# Patient Record
Sex: FEMALE | Race: WHITE | NOT HISPANIC OR LATINO | URBAN - METROPOLITAN AREA
[De-identification: names, ages, dates, MRNs, and addresses within clinical notes are randomized per-mention and may not be internally consistent; named-entity substitution may affect disease eponyms.]

---

## 2023-08-21 ENCOUNTER — HOSPITAL ENCOUNTER (OUTPATIENT)
Dept: DATA CONVERSION | Facility: HOSPITAL | Age: 58
End: 2023-08-21

## 2023-08-21 DIAGNOSIS — Z15.01 GENETIC SUSCEPTIBILITY TO MALIGNANT NEOPLASM OF BREAST: ICD-10-CM

## 2023-08-21 DIAGNOSIS — Z80.3 FAMILY HISTORY OF MALIGNANT NEOPLASM OF BREAST: ICD-10-CM

## 2023-08-31 PROBLEM — E66.9 OBESITY (BMI 30-39.9): Status: ACTIVE | Noted: 2023-08-31

## 2023-08-31 PROBLEM — I49.5 SINUS NODE DYSFUNCTION (MULTI): Status: ACTIVE | Noted: 2023-08-31

## 2023-08-31 PROBLEM — R07.9 CHEST PAIN: Status: ACTIVE | Noted: 2019-09-13

## 2023-08-31 PROBLEM — M54.50 LOW BACK PAIN: Status: ACTIVE | Noted: 2019-09-13

## 2023-08-31 PROBLEM — K80.20 CHOLELITHIASIS WITHOUT OBSTRUCTION: Status: ACTIVE | Noted: 2023-08-31

## 2023-08-31 PROBLEM — R09.89 LABILE BLOOD PRESSURE: Status: ACTIVE | Noted: 2023-08-31

## 2023-08-31 PROBLEM — N92.6 IRREGULAR MENSTRUAL CYCLE: Status: ACTIVE | Noted: 2023-08-31

## 2023-08-31 PROBLEM — F41.9 ANXIETY: Status: ACTIVE | Noted: 2023-08-31

## 2023-08-31 PROBLEM — E04.1 LEFT THYROID NODULE: Status: ACTIVE | Noted: 2023-08-31

## 2023-08-31 PROBLEM — N95.1 PERIMENOPAUSE: Status: ACTIVE | Noted: 2023-08-31

## 2023-08-31 PROBLEM — N95.0 PMB (POSTMENOPAUSAL BLEEDING): Status: ACTIVE | Noted: 2023-08-31

## 2023-08-31 PROBLEM — I48.91 RAPID ATRIAL FIBRILLATION (MULTI): Status: ACTIVE | Noted: 2023-08-31

## 2023-08-31 PROBLEM — E78.5 DYSLIPIDEMIA: Status: ACTIVE | Noted: 2023-08-31

## 2023-08-31 PROBLEM — R10.9 ABDOMINAL PAIN: Status: ACTIVE | Noted: 2023-08-31

## 2023-08-31 PROBLEM — F41.1 ANXIETY STATE: Status: ACTIVE | Noted: 2019-09-13

## 2023-08-31 PROBLEM — R93.89 ABNORMAL COMPUTED TOMOGRAPHY SCAN: Status: ACTIVE | Noted: 2023-08-31

## 2023-08-31 PROBLEM — E04.2 NONTOXIC MULTINODULAR GOITER: Status: ACTIVE | Noted: 2023-08-31

## 2023-08-31 PROBLEM — R10.13 EPIGASTRIC PAIN: Status: ACTIVE | Noted: 2023-08-31

## 2023-08-31 PROBLEM — E55.9 VITAMIN D DEFICIENCY: Status: ACTIVE | Noted: 2023-08-31

## 2023-08-31 PROBLEM — M94.0 COSTOCHONDRITIS: Status: ACTIVE | Noted: 2023-08-31

## 2023-08-31 PROBLEM — F40.243 FEAR OF FLYING: Status: ACTIVE | Noted: 2020-10-28

## 2023-08-31 PROBLEM — I49.5 TACHYCARDIA-BRADYCARDIA (MULTI): Status: ACTIVE | Noted: 2023-08-31

## 2023-08-31 PROBLEM — I48.0 PAROXYSMAL ATRIAL FIBRILLATION (MULTI): Status: ACTIVE | Noted: 2023-08-31

## 2023-08-31 PROBLEM — K21.00 REFLUX ESOPHAGITIS: Status: ACTIVE | Noted: 2019-09-13

## 2023-08-31 PROBLEM — E66.01 MORBID (SEVERE) OBESITY DUE TO EXCESS CALORIES (MULTI): Status: ACTIVE | Noted: 2023-08-31

## 2023-08-31 PROBLEM — K80.00 CALCULUS OF GALLBLADDER WITH ACUTE CHOLECYSTITIS WITHOUT OBSTRUCTION: Status: ACTIVE | Noted: 2023-08-31

## 2023-08-31 RX ORDER — MULTIVITAMIN/IRON/FOLIC ACID 18MG-0.4MG
TABLET ORAL
COMMUNITY

## 2023-08-31 RX ORDER — SOTALOL HYDROCHLORIDE 80 MG/1
80 TABLET ORAL 2 TIMES DAILY
COMMUNITY
End: 2024-03-04 | Stop reason: SDUPTHER

## 2023-08-31 RX ORDER — APIXABAN 5 MG/1
TABLET, FILM COATED ORAL
COMMUNITY
Start: 2023-03-25 | End: 2024-03-04 | Stop reason: SDUPTHER

## 2023-08-31 RX ORDER — OMEGA-3/DHA/EPA/FISH OIL 108-162 MG
1 CAPSULE ORAL DAILY
COMMUNITY
End: 2024-02-28 | Stop reason: ALTCHOICE

## 2023-08-31 RX ORDER — ZINC GLUCONATE 100 MG
1 TABLET ORAL DAILY
COMMUNITY

## 2023-08-31 RX ORDER — EPINEPHRINE 0.22MG
AEROSOL WITH ADAPTER (ML) INHALATION
COMMUNITY

## 2023-08-31 RX ORDER — ERGOCALCIFEROL (VITAMIN D2) 10 MCG
1 TABLET ORAL DAILY
COMMUNITY
End: 2023-11-28 | Stop reason: ALTCHOICE

## 2023-08-31 RX ORDER — CHOLECALCIFEROL (VITAMIN D3) 50 MCG
2000 TABLET ORAL DAILY
COMMUNITY
Start: 2019-09-13

## 2023-10-03 ENCOUNTER — TELEPHONE (OUTPATIENT)
Dept: OBSTETRICS AND GYNECOLOGY | Facility: CLINIC | Age: 58
End: 2023-10-03
Payer: COMMERCIAL

## 2023-10-03 NOTE — TELEPHONE ENCOUNTER
Est pt has Breast MRI sched  for 10/05/2023 / pt waiting to know of ok to take Lorazepam 1 mg that she has at home prior to MRI / Per Dr Campos ok to take / pt to make sure she has a  / pt agrees

## 2023-10-05 ENCOUNTER — HOSPITAL ENCOUNTER (OUTPATIENT)
Dept: RADIOLOGY | Facility: HOSPITAL | Age: 58
Discharge: HOME | End: 2023-10-05
Payer: COMMERCIAL

## 2023-10-05 DIAGNOSIS — Z80.3 FAMILY HISTORY OF MALIGNANT NEOPLASM OF BREAST: ICD-10-CM

## 2023-10-05 DIAGNOSIS — Z15.01 GENETIC SUSCEPTIBILITY TO MALIGNANT NEOPLASM OF BREAST: ICD-10-CM

## 2023-10-05 PROCEDURE — A9575 INJ GADOTERATE MEGLUMI 0.1ML: HCPCS | Performed by: OBSTETRICS & GYNECOLOGY

## 2023-10-05 PROCEDURE — 2550000001 HC RX 255 CONTRASTS: Performed by: OBSTETRICS & GYNECOLOGY

## 2023-10-05 PROCEDURE — 77049 MRI BREAST C-+ W/CAD BI: CPT

## 2023-10-05 RX ORDER — GADOTERATE MEGLUMINE 376.9 MG/ML
20 INJECTION INTRAVENOUS
Status: COMPLETED | OUTPATIENT
Start: 2023-10-05 | End: 2023-10-05

## 2023-10-05 RX ADMIN — GADOTERATE MEGLUMINE 20 ML: 376.9 INJECTION INTRAVENOUS at 16:17

## 2023-11-28 ENCOUNTER — OFFICE VISIT (OUTPATIENT)
Dept: PRIMARY CARE | Facility: CLINIC | Age: 58
End: 2023-11-28
Payer: COMMERCIAL

## 2023-11-28 VITALS
SYSTOLIC BLOOD PRESSURE: 130 MMHG | BODY MASS INDEX: 43.41 KG/M2 | HEIGHT: 63 IN | OXYGEN SATURATION: 98 % | WEIGHT: 245 LBS | HEART RATE: 63 BPM | DIASTOLIC BLOOD PRESSURE: 78 MMHG | TEMPERATURE: 97 F

## 2023-11-28 DIAGNOSIS — E66.01 CLASS 3 SEVERE OBESITY WITH SERIOUS COMORBIDITY AND BODY MASS INDEX (BMI) OF 40.0 TO 44.9 IN ADULT, UNSPECIFIED OBESITY TYPE (MULTI): ICD-10-CM

## 2023-11-28 DIAGNOSIS — E78.5 DYSLIPIDEMIA: ICD-10-CM

## 2023-11-28 DIAGNOSIS — Z00.00 ANNUAL PHYSICAL EXAM: Primary | ICD-10-CM

## 2023-11-28 DIAGNOSIS — I48.0 PAROXYSMAL ATRIAL FIBRILLATION (MULTI): ICD-10-CM

## 2023-11-28 DIAGNOSIS — F41.9 ANXIETY: ICD-10-CM

## 2023-11-28 PROCEDURE — 1036F TOBACCO NON-USER: CPT | Performed by: INTERNAL MEDICINE

## 2023-11-28 PROCEDURE — 3008F BODY MASS INDEX DOCD: CPT | Performed by: INTERNAL MEDICINE

## 2023-11-28 PROCEDURE — 99396 PREV VISIT EST AGE 40-64: CPT | Performed by: INTERNAL MEDICINE

## 2023-11-28 RX ORDER — ALPRAZOLAM 0.25 MG/1
0.25 TABLET ORAL 2 TIMES DAILY PRN
Qty: 14 TABLET | Refills: 0 | Status: SHIPPED | OUTPATIENT
Start: 2023-11-28 | End: 2024-02-28

## 2023-11-28 ASSESSMENT — ENCOUNTER SYMPTOMS
FATIGUE: 0
SEIZURES: 0
POLYPHAGIA: 0
DEPRESSION: 0
TROUBLE SWALLOWING: 0
UNEXPECTED WEIGHT CHANGE: 0
OCCASIONAL FEELINGS OF UNSTEADINESS: 0
CHILLS: 0
MYALGIAS: 0
COUGH: 0
VOMITING: 0
POLYDIPSIA: 0
PALPITATIONS: 0
DYSURIA: 0
ABDOMINAL PAIN: 0
NUMBNESS: 0
LOSS OF SENSATION IN FEET: 0
WHEEZING: 0
SINUS PRESSURE: 0
FREQUENCY: 0
SHORTNESS OF BREATH: 0
NAUSEA: 0
TREMORS: 0
BACK PAIN: 0
BLOOD IN STOOL: 0

## 2023-11-28 ASSESSMENT — COLUMBIA-SUICIDE SEVERITY RATING SCALE - C-SSRS
1. IN THE PAST MONTH, HAVE YOU WISHED YOU WERE DEAD OR WISHED YOU COULD GO TO SLEEP AND NOT WAKE UP?: NO
2. HAVE YOU ACTUALLY HAD ANY THOUGHTS OF KILLING YOURSELF?: NO
6. HAVE YOU EVER DONE ANYTHING, STARTED TO DO ANYTHING, OR PREPARED TO DO ANYTHING TO END YOUR LIFE?: NO

## 2023-11-28 ASSESSMENT — PATIENT HEALTH QUESTIONNAIRE - PHQ9
1. LITTLE INTEREST OR PLEASURE IN DOING THINGS: NOT AT ALL
SUM OF ALL RESPONSES TO PHQ9 QUESTIONS 1 AND 2: 0
2. FEELING DOWN, DEPRESSED OR HOPELESS: NOT AT ALL

## 2023-11-28 ASSESSMENT — PAIN SCALES - GENERAL: PAINLEVEL: 2

## 2023-11-28 NOTE — PROGRESS NOTES
"Subjective   Patient ID: Minoo CABALLERO is a 57 y.o. female who presents for Annual Exam.    HPI     Takes Xanax for traveling         Had cholecystectomy on 11/01/22 by Dr Marquez. Had diarrhea for first few days which has resolved now.         She was diagnosed with A fib when she was admitted for acute cholecystitis. Currently on sotalol and eliquis         Had Colonoscopy in 2020, by Dr Siddiqui, will get records. - advised repeat in 10 years.    Review of Systems   Constitutional:  Negative for chills, fatigue and unexpected weight change.   HENT:  Negative for postnasal drip, sinus pressure and trouble swallowing.    Respiratory:  Negative for cough, shortness of breath and wheezing.    Cardiovascular:  Negative for chest pain, palpitations and leg swelling.   Gastrointestinal:  Negative for abdominal pain, blood in stool, nausea and vomiting.   Endocrine: Negative for polydipsia, polyphagia and polyuria.   Genitourinary:  Negative for dysuria and frequency.   Musculoskeletal:  Negative for back pain and myalgias.   Skin:  Negative for rash.   Neurological:  Negative for tremors, seizures and numbness.   Psychiatric/Behavioral:  Negative for behavioral problems.        Objective   /78 (BP Location: Left arm, Patient Position: Sitting, BP Cuff Size: Large adult)   Pulse 63   Temp 36.1 °C (97 °F) (Temporal)   Ht 1.6 m (5' 3\")   Wt 111 kg (245 lb)   SpO2 98%   BMI 43.40 kg/m²     Physical Exam  Constitutional:       General: She is not in acute distress.  HENT:      Head: Normocephalic and atraumatic.      Right Ear: Tympanic membrane normal.      Left Ear: Tympanic membrane normal.   Eyes:      Extraocular Movements: Extraocular movements intact.      Conjunctiva/sclera: Conjunctivae normal.      Pupils: Pupils are equal, round, and reactive to light.   Neck:      Vascular: No carotid bruit.   Cardiovascular:      Rate and Rhythm: Normal rate and regular rhythm.      Pulses: Normal pulses.      " Heart sounds: No murmur heard.  Pulmonary:      Effort: Pulmonary effort is normal.      Breath sounds: Normal breath sounds. No wheezing or rales.   Abdominal:      General: Bowel sounds are normal.      Palpations: Abdomen is soft. There is no mass.      Tenderness: There is no abdominal tenderness. There is no guarding.   Musculoskeletal:         General: Normal range of motion.      Cervical back: Normal range of motion and neck supple.      Right lower leg: No edema.      Left lower leg: No edema.   Lymphadenopathy:      Cervical: No cervical adenopathy.   Skin:     General: Skin is warm and dry.      Findings: No lesion.   Neurological:      General: No focal deficit present.      Mental Status: She is alert and oriented to person, place, and time.      Sensory: No sensory deficit.      Gait: Gait normal.   Psychiatric:         Mood and Affect: Mood normal.         Assessment/Plan        MADAN was seen today for annual exam.  Diagnoses and all orders for this visit:  Annual physical exam (Primary)  Paroxysmal atrial fibrillation (CMS/HCC)  -     TSH with reflex to Free T4 if abnormal; Future  Dyslipidemia  -     Lipid Panel; Future  Anxiety  -     ALPRAZolam (Xanax) 0.25 mg tablet; Take 1 tablet (0.25 mg) by mouth 2 times a day as needed for anxiety for up to 7 days.  Class 3 severe obesity with serious comorbidity and body mass index (BMI) of 40.0 to 44.9 in adult, unspecified obesity type (CMS/HCC)       Current Outpatient Medications   Medication Instructions    ALPRAZolam (XANAX) 0.25 mg, oral, 2 times daily PRN    b complex 0.4 mg tablet as directed Orally    cholecalciferol (VITAMIN D-3) 2,000 Units, oral, Daily    coenzyme Q-10 (Co Q-10) 100 mg capsule as directed Orally    Eliquis 5 mg tablet     multivit-min/iron/folic acid/K (ADULTS MULTIVITAMIN ORAL) 1 tablet, oral, Daily    omega 3-dha-epa-fish oil 108-162-1,000 mg capsule 1 capsule, oral, Daily    sotalol (BETAPACE) 80 mg, oral, 2 times daily     zinc gluconate 100 mg tablet 1 tablet, oral, Daily      Advised to exercise at least 30 minutes a day and increase as tolerated  Limit daily calories to less than 1800 werner a day  Avoid snacking in between meals.

## 2023-11-30 PROBLEM — I49.5 TACHYCARDIA-BRADYCARDIA (MULTI): Status: RESOLVED | Noted: 2023-08-31 | Resolved: 2023-11-30

## 2024-02-27 NOTE — PROGRESS NOTES
"Annual-menopause  Subjective   Minoo Lopez is a 58 y.o. female who is here for a routine exam.   Complaints:  denies vag bleed or discharge; denies pelvic  pain, pressure, or persistent bloating.        Last seen after h/s eval pmb( episode 2023)  w D&C/mirena insertion 23; findings post wall carpeted w lush endometrial lining and  clear endocervical polyp. Pathology benign proliferative endometrium. Reports only scant brown shedding since.  PMHx: BMI 41, prev 43.      Afib; chronic anticoagulation. Dr. Wahl and Dr. Pettit       Endocervical polyp/pmb .  Surg Hx: Lapscopic cholecystectomy with IOC 2022        d&c -hysterscopy 2023  Father: , KIDNEY DISEASE, HTN, HEART DISEASE  Mother:  81 yrs, OVARIAN CANCER, STROKE  Last pap  2022-neg,hpv-neg  Mamm 2023-neg/TC risk 23% ; MRI breast 10/6/23 neg  Pelvic US: 2023 13 cm uterus w 8cm fibroid; endo 15mm; ovaries n/s  currently sexually active; denies exposure concerns.   Other 10/2020 endo bx neg.   Total pregnancies  0.   Review of Systems   Constitutional:  Negative for activity change, fatigue and unexpected weight change.   Respiratory:  Negative for chest tightness and shortness of breath.    Cardiovascular:  Negative for chest pain and leg swelling.   Gastrointestinal:  Negative for abdominal distention and abdominal pain.   Genitourinary:  Negative for difficulty urinating, dysuria, genital sores, pelvic pain, vaginal bleeding, vaginal discharge and vaginal pain.   Musculoskeletal:  Negative for gait problem and joint swelling.   Skin:  Negative for color change and rash.   Neurological:  Negative for dizziness, weakness and headaches.   Hematological:  Negative for adenopathy.   Objective Visit Vitals  /86   Ht 1.6 m (5' 3\")   Wt 106 kg (233 lb 6.4 oz)   BMI 41.34 kg/m²   OB Status Implant   Smoking Status Never   BSA 2.17 m²       General:   Alert and oriented, in no acute distress   Neck: " Supple. No visible thyromegaly.    Breast/Axilla: Normal to palpation bilaterally without masses, skin changes, or nipple discharge.    Abdomen: Soft, non-tender, without masses or organomegaly   Vulva: Normal architecture without erythema, masses, or lesions.    Vagina: Normal mucosa without lesions, masses.  No abnormal vaginal discharge. No blood   Cervix: Normal without masses, lesions, or signs of cervicitis. No blood   Uterus: Grossly  mobile, nontender; fundus at symph;exm limited by bmi   Adnexa: No palp mass or tenderness; exam limited by bmi   Pelvic Floor No POP noted. No high tone pelvic floor    Psych  Rectal Normal affect. Normal mood.      Assessment/Plan   Encounter Diagnoses   Name Primary?    Well female exam with routine gynecological exam; grossly normal breast exam; GYN exam grossly normal but limited by high BMI.  Next Pap due 2025. Yes    Screening mammogram for breast cancer; requisition given.     Irregular bleeding; small amount of brown shedding which can be expected on Mirena IUD.  Given history of endometrial polyp, will evaluate endometrium with ultrasound.     Surveillance of intrauterine contraceptive device; proper position per speculum exam.     Body mass index 40.0-44.9, adult (CMS/HCC); adiposity increases risk for endometrial hyperplasia and/or cancer ; use of Mirena mitigates this risk.     Chronic anticoagulation; increases risk for bleeding; upcoming cardio evaluation to determine other treatment options.     Mirena iud placed to provide local progesterone due to ongoing risks for pmb=high bmi, nulliparity, and chronic anticoagulation.  Sabrina Campos MD

## 2024-02-28 ENCOUNTER — OFFICE VISIT (OUTPATIENT)
Dept: OBSTETRICS AND GYNECOLOGY | Facility: CLINIC | Age: 59
End: 2024-02-28
Payer: COMMERCIAL

## 2024-02-28 VITALS
SYSTOLIC BLOOD PRESSURE: 142 MMHG | BODY MASS INDEX: 41.36 KG/M2 | DIASTOLIC BLOOD PRESSURE: 86 MMHG | WEIGHT: 233.4 LBS | HEIGHT: 63 IN

## 2024-02-28 DIAGNOSIS — Z01.419 WELL FEMALE EXAM WITH ROUTINE GYNECOLOGICAL EXAM: Primary | ICD-10-CM

## 2024-02-28 DIAGNOSIS — Z79.01 CHRONIC ANTICOAGULATION: ICD-10-CM

## 2024-02-28 DIAGNOSIS — N92.6 IRREGULAR BLEEDING: ICD-10-CM

## 2024-02-28 DIAGNOSIS — Z30.431 SURVEILLANCE OF INTRAUTERINE CONTRACEPTIVE DEVICE: ICD-10-CM

## 2024-02-28 DIAGNOSIS — Z12.31 SCREENING MAMMOGRAM FOR BREAST CANCER: ICD-10-CM

## 2024-02-28 PROCEDURE — 3008F BODY MASS INDEX DOCD: CPT | Performed by: OBSTETRICS & GYNECOLOGY

## 2024-02-28 PROCEDURE — 1036F TOBACCO NON-USER: CPT | Performed by: OBSTETRICS & GYNECOLOGY

## 2024-02-28 PROCEDURE — 99396 PREV VISIT EST AGE 40-64: CPT | Performed by: OBSTETRICS & GYNECOLOGY

## 2024-02-28 RX ORDER — LEVONORGESTREL 52 MG/1
1 INTRAUTERINE DEVICE INTRAUTERINE ONCE
COMMUNITY
Start: 2023-06-20

## 2024-02-28 ASSESSMENT — ENCOUNTER SYMPTOMS
FATIGUE: 0
HEADACHES: 0
WEAKNESS: 0
UNEXPECTED WEIGHT CHANGE: 0
LOSS OF SENSATION IN FEET: 0
DYSURIA: 0
ABDOMINAL DISTENTION: 0
SHORTNESS OF BREATH: 0
ABDOMINAL PAIN: 0
COLOR CHANGE: 0
JOINT SWELLING: 0
ACTIVITY CHANGE: 0
DIFFICULTY URINATING: 0
ADENOPATHY: 0
DIZZINESS: 0
CHEST TIGHTNESS: 0
DEPRESSION: 0
OCCASIONAL FEELINGS OF UNSTEADINESS: 0

## 2024-02-28 ASSESSMENT — LIFESTYLE VARIABLES
AUDIT-C TOTAL SCORE: 3
SKIP TO QUESTIONS 9-10: 1
HOW OFTEN DO YOU HAVE A DRINK CONTAINING ALCOHOL: 2-3 TIMES A WEEK
HOW MANY STANDARD DRINKS CONTAINING ALCOHOL DO YOU HAVE ON A TYPICAL DAY: 1 OR 2
HOW OFTEN DO YOU HAVE SIX OR MORE DRINKS ON ONE OCCASION: NEVER

## 2024-02-28 ASSESSMENT — PATIENT HEALTH QUESTIONNAIRE - PHQ9
SUM OF ALL RESPONSES TO PHQ9 QUESTIONS 1 & 2: 0
1. LITTLE INTEREST OR PLEASURE IN DOING THINGS: NOT AT ALL
2. FEELING DOWN, DEPRESSED OR HOPELESS: NOT AT ALL

## 2024-02-28 ASSESSMENT — PAIN SCALES - GENERAL: PAINLEVEL: 0-NO PAIN

## 2024-02-29 PROBLEM — Z30.431 SURVEILLANCE OF INTRAUTERINE CONTRACEPTIVE DEVICE: Status: ACTIVE | Noted: 2024-02-29

## 2024-02-29 PROBLEM — Z79.01 CHRONIC ANTICOAGULATION: Status: ACTIVE | Noted: 2024-02-29

## 2024-02-29 PROBLEM — Z12.31 SCREENING MAMMOGRAM FOR BREAST CANCER: Status: ACTIVE | Noted: 2024-02-29

## 2024-02-29 PROBLEM — Z01.419 WELL FEMALE EXAM WITH ROUTINE GYNECOLOGICAL EXAM: Status: ACTIVE | Noted: 2024-02-29

## 2024-03-04 ENCOUNTER — OFFICE VISIT (OUTPATIENT)
Dept: CARDIOLOGY | Facility: CLINIC | Age: 59
End: 2024-03-04
Payer: COMMERCIAL

## 2024-03-04 VITALS
BODY MASS INDEX: 40.21 KG/M2 | DIASTOLIC BLOOD PRESSURE: 70 MMHG | SYSTOLIC BLOOD PRESSURE: 124 MMHG | WEIGHT: 227 LBS | HEART RATE: 106 BPM

## 2024-03-04 DIAGNOSIS — I48.0 PAF (PAROXYSMAL ATRIAL FIBRILLATION) (MULTI): Primary | ICD-10-CM

## 2024-03-04 PROCEDURE — 99213 OFFICE O/P EST LOW 20 MIN: CPT

## 2024-03-04 PROCEDURE — 3008F BODY MASS INDEX DOCD: CPT

## 2024-03-04 PROCEDURE — 1036F TOBACCO NON-USER: CPT

## 2024-03-04 RX ORDER — SOTALOL HYDROCHLORIDE 80 MG/1
80 TABLET ORAL 2 TIMES DAILY
Qty: 180 TABLET | Refills: 1 | Status: SHIPPED | OUTPATIENT
Start: 2024-03-04

## 2024-03-04 ASSESSMENT — PAIN SCALES - GENERAL: PAINLEVEL: 0-NO PAIN

## 2024-03-04 ASSESSMENT — ENCOUNTER SYMPTOMS: PALPITATIONS: 1

## 2024-03-04 NOTE — PROGRESS NOTES
Chief Complaint:   Atrial Fibrillation (Sotalol) and Follow-up     History Of Present Illness:    Minoo Lopez is a 58 y.o. female with a past medical history of hypertension, obesity and paroxysmal atrial fibrillation in the setting of a normal left ventricular systolic function with no significant valvular abnormalities.  Patient has been maintained on low-dose sotalol and apixaban with little recurrence of atrial fibrillation.  Patient states she monitors herself closely with a [employer] mobile daily.  Patient states she has not had an episode of atrial fibrillation since September.  However, she presents to the office today in atrial fibrillation.  She is compliant with her anticoagulation therapy.     Last Recorded Vitals:  Vitals:    03/04/24 0841   BP: 124/70   Pulse: 106   Weight: 103 kg (227 lb)       Past Medical History:  She has a past medical history of A-fib (CMS/HCC) and Tachycardia-bradycardia (CMS/HCC) (08/31/2023).    Past Surgical History:  She has a past surgical history that includes Cholecystectomy.      Social History:  She reports that she has never smoked. She has never used smokeless tobacco. She reports current alcohol use. She reports that she does not use drugs.    Family History:  Family History   Problem Relation Name Age of Onset    Ovarian cancer Mother      Stroke Mother      Diabetes Mother      Breast cancer Mother      Kidney disease Father      Hypertension Father      Heart disease Father      Aneurysm Maternal Grandmother      Diabetes Maternal Grandfather      Other (Circulation issues) Paternal Grandmother       Allergies:  Clindamycin    Outpatient Medications:  Current Outpatient Medications   Medication Instructions    ALPRAZolam (XANAX) 0.25 mg, oral, 2 times daily PRN    b complex 0.4 mg tablet as directed Orally    cholecalciferol (VITAMIN D-3) 2,000 Units, oral, Daily    coenzyme Q-10 (Co Q-10) 100 mg capsule as directed Orally    Eliquis 5 mg tablet     Mirena 21  "mcg/24 hours (8 yrs) 52 mg IUD 1 each, intrauterine, Once    multivit-min/iron/folic acid/K (ADULTS MULTIVITAMIN ORAL) 1 tablet, oral, Daily    sotalol (BETAPACE) 80 mg, oral, 2 times daily    zinc gluconate 100 mg tablet 1 tablet, oral, Daily     Physical Exam:  Physical Exam  Vitals reviewed.   Constitutional:       Appearance: Normal appearance.   Cardiovascular:      Rate and Rhythm: Tachycardia present. Rhythm irregular.      Pulses: Normal pulses.      Heart sounds: Normal heart sounds.   Pulmonary:      Effort: Pulmonary effort is normal.      Breath sounds: Normal breath sounds.   Abdominal:      General: Bowel sounds are normal.      Palpations: Abdomen is soft.   Musculoskeletal:         General: Normal range of motion.   Skin:     General: Skin is warm and dry.   Neurological:      General: No focal deficit present.      Mental Status: She is alert and oriented to person, place, and time.   Review of Systems   Cardiovascular:  Positive for palpitations.   All other systems reviewed and are negative.         Last Labs:  CBC -  Lab Results   Component Value Date    WBC 6.8 07/06/2023    HGB 12.3 07/06/2023    HCT 37.7 07/06/2023    MCV 95.0 07/06/2023     07/06/2023       CMP -  Lab Results   Component Value Date    CALCIUM 9.4 07/06/2023    PROT 7.2 11/02/2022    ALBUMIN 3.5 11/02/2022    AST 55 (H) 11/02/2022    ALT 40 11/02/2022    ALKPHOS 80 11/02/2022    BILITOT 0.8 11/02/2022       LIPID PANEL -   Lab Results   Component Value Date    CHOL 203 (H) 11/11/2021    TRIG 94 11/11/2021    HDL 56 11/11/2021    CHHDL 3.6 11/11/2021       RENAL FUNCTION PANEL -   Lab Results   Component Value Date    GLUCOSE 88 07/06/2023     07/06/2023    K 4.2 07/06/2023     07/06/2023    CO2 24 07/06/2023    ANIONGAP 13 07/06/2023    BUN 16 07/06/2023    CREATININE 0.8 07/06/2023    CALCIUM 9.4 07/06/2023    ALBUMIN 3.5 11/02/2022        No results found for: \"BNP\", \"HGBA1C\"    Last Cardiology " Tests:  ECG:  Atrial fibrillation at 106 bpm.  QRS duration 66 ms, QTc 435 ms.    Echo:  TRANSTHORACIC ECHOCARDIOGRAM REPORT        Patient Name:     MADAN CABALLERO  Reading Physician:   [id number] Mitch Ponce DO  Study Date:       10/31/2022          Referring Physician: [id number] Giacomo Pritchard DO  MRN/PID:          [id number]            PCP:  Accession/Order#: [id number]          Department Location:  YOB: 1965          Fellow:  Gender:           F                   Nurse:  Admit Date:       10/29/2022          Sonographer:         Bill Waddell [facility]  Admission Status: Inpatient - Routine Additional Staff:  Height:           160.02 cm           CC Report to:  Weight:           113.40 kg           Study Type:          ECHO 2-D WO  CONTRAST  BSA:              2.13 m2  Blood Pressure: 131 /88 mmHg     Diagnosis/ICD: R07.89-Other chest pain  Indication:    Chest Pain  Procedure/CPT: Echo Complete w Full [id number]  Study Detail: The following Echo studies were performed: 2D, M-Mode, Doppler  and  color flow. Technically challenging study due to poor acoustic  windows and body habitus.        PHYSICIAN INTERPRETATION:  Left Ventricle: Left ventricular systolic function is normal, with an  estimated ejection fraction of 60-65%. There are no regional wall motion  abnormalities. The left ventricular cavity size is normal. There is mild  concentric left ventricular hypertrophy. Spectral Doppler shows a normal  pattern of left ventricular diastolic filling.  Left Atrium: The left atrium is mildly dilated.  Right Ventricle: The right ventricle is normal in size. There is normal  right ventricular global systolic function.  Right Atrium: The right atrium is normal in size.  Aortic Valve: The aortic valve appears structurally normal. There is no  evidence of aortic valve regurgitation. The peak instantaneous gradient of  the aortic valve is 8.4 mmHg. The mean gradient of the aortic valve is 4.0  mmHg.  Mitral  Valve: The mitral valve is normal in structure. There is no evidence  of mitral valve regurgitation.  Tricuspid Valve: The tricuspid valve is structurally normal. No evidence of  tricuspid regurgitation.  Pulmonic Valve: The pulmonic valve is not well visualized. The pulmonic  valve regurgitation was not well visualized.  Pericardium: There is no pericardial effusion noted.  Aorta: The aortic root is normal.        CONCLUSIONS:  1. Left ventricular systolic function is normal with a 60-65% estimated  ejection fraction.  2. Mild concentric left ventricular hypertrophy.  3. Mild left atrial enlargement.    Assessment/Plan     Paroxysmal atrial fibrillation    We did discuss alternate antiarrhythmic drugs in addition to catheter-based therapy.  Patient is very hesitant to pursue cryoablation at this juncture.  Patient will monitor herself closely utilizing her [employer] mobile and contact us if she remains in atrial fibrillation.     Idania Bush, AMMON-CNP

## 2024-04-06 ENCOUNTER — HOSPITAL ENCOUNTER (OUTPATIENT)
Dept: RADIOLOGY | Facility: CLINIC | Age: 59
Discharge: HOME | End: 2024-04-06
Payer: COMMERCIAL

## 2024-04-06 VITALS — HEIGHT: 63 IN | WEIGHT: 221 LBS | BODY MASS INDEX: 39.16 KG/M2

## 2024-04-06 DIAGNOSIS — Z12.31 SCREENING MAMMOGRAM FOR BREAST CANCER: ICD-10-CM

## 2024-04-06 PROCEDURE — 77067 SCR MAMMO BI INCL CAD: CPT

## 2024-04-06 PROCEDURE — 77063 BREAST TOMOSYNTHESIS BI: CPT | Performed by: RADIOLOGY

## 2024-04-06 PROCEDURE — 77067 SCR MAMMO BI INCL CAD: CPT | Performed by: RADIOLOGY

## 2024-04-12 ENCOUNTER — TELEPHONE (OUTPATIENT)
Dept: CARDIOLOGY | Facility: CLINIC | Age: 59
End: 2024-04-12
Payer: COMMERCIAL

## 2024-06-19 ENCOUNTER — HOSPITAL ENCOUNTER (OUTPATIENT)
Dept: RADIOLOGY | Facility: CLINIC | Age: 59
Discharge: HOME | End: 2024-06-19
Payer: COMMERCIAL

## 2024-06-19 DIAGNOSIS — N92.6 IRREGULAR BLEEDING: ICD-10-CM

## 2024-06-19 PROCEDURE — 76856 US EXAM PELVIC COMPLETE: CPT | Performed by: RADIOLOGY

## 2024-06-19 PROCEDURE — 76856 US EXAM PELVIC COMPLETE: CPT

## 2024-06-19 PROCEDURE — 76830 TRANSVAGINAL US NON-OB: CPT | Performed by: RADIOLOGY

## 2024-09-05 ENCOUNTER — OFFICE VISIT (OUTPATIENT)
Dept: CARDIOLOGY | Facility: CLINIC | Age: 59
End: 2024-09-05
Payer: COMMERCIAL

## 2024-09-05 VITALS — DIASTOLIC BLOOD PRESSURE: 82 MMHG | SYSTOLIC BLOOD PRESSURE: 130 MMHG | HEART RATE: 64 BPM

## 2024-09-05 DIAGNOSIS — I48.0 PAF (PAROXYSMAL ATRIAL FIBRILLATION) (MULTI): Primary | ICD-10-CM

## 2024-09-05 PROBLEM — I48.91 RAPID ATRIAL FIBRILLATION (MULTI): Status: RESOLVED | Noted: 2023-08-31 | Resolved: 2024-09-05

## 2024-09-05 PROCEDURE — 93005 ELECTROCARDIOGRAM TRACING: CPT | Performed by: INTERNAL MEDICINE

## 2024-09-05 PROCEDURE — 99214 OFFICE O/P EST MOD 30 MIN: CPT | Performed by: INTERNAL MEDICINE

## 2024-09-05 PROCEDURE — 1036F TOBACCO NON-USER: CPT | Performed by: INTERNAL MEDICINE

## 2024-09-05 PROCEDURE — 93010 ELECTROCARDIOGRAM REPORT: CPT | Performed by: INTERNAL MEDICINE

## 2024-09-05 ASSESSMENT — PATIENT HEALTH QUESTIONNAIRE - PHQ9
SUM OF ALL RESPONSES TO PHQ9 QUESTIONS 1 AND 2: 0
1. LITTLE INTEREST OR PLEASURE IN DOING THINGS: NOT AT ALL
2. FEELING DOWN, DEPRESSED OR HOPELESS: NOT AT ALL

## 2024-09-05 ASSESSMENT — COLUMBIA-SUICIDE SEVERITY RATING SCALE - C-SSRS
6. HAVE YOU EVER DONE ANYTHING, STARTED TO DO ANYTHING, OR PREPARED TO DO ANYTHING TO END YOUR LIFE?: NO
1. IN THE PAST MONTH, HAVE YOU WISHED YOU WERE DEAD OR WISHED YOU COULD GO TO SLEEP AND NOT WAKE UP?: NO
2. HAVE YOU ACTUALLY HAD ANY THOUGHTS OF KILLING YOURSELF?: NO

## 2024-09-05 NOTE — ASSESSMENT & PLAN NOTE
History as above.  Continue sotalol.  The patient knows to call us if her atrial fibrillation does not subside.  She will continue to follow herself on a daily basis with her [employer] (formerly [employer]) mobile device.  Continue anticoagulation given her [id number] score of 3

## 2024-09-05 NOTE — PROGRESS NOTES
Chief Complaint   Patient presents with    Atrial Fibrillation      6 month follow up            Patient is well-known to me.  She is a 58-year-old female with a history of paroxysmal atrial fibrillation in the setting of hypertension, diabetes, and obesity.  She follows herself with her [employer] mobile device and notes that she has atrial fibrillation about once a week.  Often when she is nervous like today coming to the office she will go into atrial fibrillation.  Most episodes last less than a day.  She has symptoms of palpitations but not much in the way of other symptoms.  She is quite reluctant to undergo catheter-based therapy and prefers to continue with her current regimen of anticoagulation and sotalol    Atrial Fibrillation  Past medical history includes atrial fibrillation.        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    Irregular bleeding 08/31/2023    PMB (postmenopausal bleeding) 08/31/2023    Labile blood pressure 08/31/2023    Left thyroid nodule 08/31/2023    Low back pain 09/13/2019    Nontoxic multinodular goiter 08/31/2023    Morbid (severe) obesity due to excess calories (Multi) 08/31/2023    Obesity (BMI 30-39.9) 08/31/2023    Paroxysmal atrial fibrillation (Multi) 08/31/2023    Perimenopause 08/31/2023    Rapid atrial fibrillation (Multi) 08/31/2023    Reflux esophagitis 09/13/2019    Sinus node dysfunction (Multi) 08/31/2023    Vitamin D deficiency 08/31/2023    Screening mammogram for breast cancer 02/29/2024    Surveillance of intrauterine contraceptive device 02/29/2024    Body mass  "index 40.0-44.9, adult (Multi) 02/29/2024    Chronic anticoagulation 02/29/2024     Resolved Ambulatory Problems     Diagnosis Date Noted    Tachycardia-bradycardia (Multi) 08/31/2023     Past Medical History:   Diagnosis Date    A-fib (Multi)         Review of Systems   All other systems reviewed and are negative.       Objective     Vitals:    09/05/24 0837   BP: 130/82   Pulse: 64        Constitutional:       Appearance: Healthy appearance. Obese.   Pulmonary:      Effort: Pulmonary effort is normal.      Breath sounds: Normal breath sounds.   Cardiovascular:      PMI at left midclavicular line. Tachycardia present. Irregularly irregular rhythm.      Murmurs: There is a systolic murmur.      No gallop.  No click. No rub.   Pulses:     Intact distal pulses.   Abdominal:      General: Bowel sounds are normal.   Musculoskeletal:      Cervical back: Neck supple. Skin:     General: Skin is warm and dry.   Neurological:      General: No focal deficit present.            Lab Review:   No visits with results within 2 Month(s) from this visit.   Latest known visit with results is:   [facility] Encounter on 07/14/2023   Component Date Value    CONVERTED FINAL DIAGNOSIS 07/14/2023                      Value:  ENDOMETRIUM, CURETTINGS:       PROLIFERATIVE-TYPE ENDOMETRIUM, SEE COMMENT.    Comment: There is no atypical endometrial hyperplasia or malignancy.  Margaux Voss M.D.  07/17/2023 16:35  [facility] - 07/17/2023      CONVERTED CLINICAL DIAGN* 07/14/2023 Post menopausal bleeding     CONVERTED GROSS DESCRIPT* 07/14/2023                      Value:Received in formalin labeled with the patient's name and hospital number  and, \"[facility]\" is a 0.8 x 0.6 x 0.1 cm aggregate of gray-tan soft tissue  fragments and possible mucus. The specimen is entirely submitted in one  cassette.      CONVERTED PRE-OPERATIVE * 07/14/2023 D&C, hysteroscopy     CONVERTED MICROSCOPIC DE* 07/14/2023 Slides examined; description omitted.        ECG:  Coarse " atrial fibrillation with a ventricular response in the 60s QRS duration 80 ms QTc 410 ms    Assessment/plan:  History as above.  Continue sotalol.  The patient knows to call us if her atrial fibrillation does not subside.  She will continue to follow herself on a daily basis with her [employer] device.  Continue anticoagulation given her [id number] score of 3    Problem List Items Addressed This Visit    None  Visit Diagnoses       PAF (paroxysmal atrial fibrillation) (Multi)    -  Primary    Relevant Orders    ECG 12 lead (Clinic Performed)    Basic metabolic panel    Magnesium

## 2024-09-18 DIAGNOSIS — I48.0 PAF (PAROXYSMAL ATRIAL FIBRILLATION) (MULTI): ICD-10-CM

## 2024-11-17 DIAGNOSIS — I48.0 PAF (PAROXYSMAL ATRIAL FIBRILLATION) (MULTI): ICD-10-CM

## 2024-11-18 RX ORDER — SOTALOL HYDROCHLORIDE 80 MG/1
80 TABLET ORAL 2 TIMES DAILY
Qty: 180 TABLET | Refills: 3 | Status: SHIPPED | OUTPATIENT
Start: 2024-11-18

## 2024-12-02 ENCOUNTER — OFFICE VISIT (OUTPATIENT)
Dept: PRIMARY CARE | Facility: CLINIC | Age: 59
End: 2024-12-02
Payer: COMMERCIAL

## 2024-12-02 VITALS
DIASTOLIC BLOOD PRESSURE: 80 MMHG | TEMPERATURE: 96.9 F | HEART RATE: 63 BPM | BODY MASS INDEX: 38.8 KG/M2 | HEIGHT: 63 IN | WEIGHT: 219 LBS | SYSTOLIC BLOOD PRESSURE: 130 MMHG | OXYGEN SATURATION: 98 %

## 2024-12-02 DIAGNOSIS — M54.50 LOW BACK PAIN, UNSPECIFIED BACK PAIN LATERALITY, UNSPECIFIED CHRONICITY, UNSPECIFIED WHETHER SCIATICA PRESENT: ICD-10-CM

## 2024-12-02 DIAGNOSIS — F41.9 ANXIETY: ICD-10-CM

## 2024-12-02 DIAGNOSIS — I48.0 PAROXYSMAL ATRIAL FIBRILLATION (MULTI): ICD-10-CM

## 2024-12-02 DIAGNOSIS — Z00.00 ANNUAL PHYSICAL EXAM: Primary | ICD-10-CM

## 2024-12-02 DIAGNOSIS — R73.9 HYPERGLYCEMIA: ICD-10-CM

## 2024-12-02 PROCEDURE — 1036F TOBACCO NON-USER: CPT | Performed by: INTERNAL MEDICINE

## 2024-12-02 PROCEDURE — 99396 PREV VISIT EST AGE 40-64: CPT | Performed by: INTERNAL MEDICINE

## 2024-12-02 PROCEDURE — 3008F BODY MASS INDEX DOCD: CPT | Performed by: INTERNAL MEDICINE

## 2024-12-02 RX ORDER — CYCLOBENZAPRINE HCL 5 MG
5 TABLET ORAL NIGHTLY PRN
Qty: 15 TABLET | Refills: 1 | Status: SHIPPED | OUTPATIENT
Start: 2024-12-02 | End: 2025-01-01

## 2024-12-02 RX ORDER — ALPRAZOLAM 0.25 MG/1
0.25 TABLET ORAL 2 TIMES DAILY PRN
Qty: 14 TABLET | Refills: 0 | Status: SHIPPED | OUTPATIENT
Start: 2024-12-02 | End: 2024-12-09

## 2024-12-02 ASSESSMENT — ENCOUNTER SYMPTOMS
COUGH: 0
CHILLS: 0
NUMBNESS: 0
VOMITING: 0
LOSS OF SENSATION IN FEET: 0
SINUS PRESSURE: 0
TROUBLE SWALLOWING: 0
POLYPHAGIA: 0
MYALGIAS: 0
ABDOMINAL PAIN: 0
UNEXPECTED WEIGHT CHANGE: 0
FATIGUE: 0
TREMORS: 0
WHEEZING: 0
DEPRESSION: 0
BLOOD IN STOOL: 0
FREQUENCY: 0
SHORTNESS OF BREATH: 0
NAUSEA: 0
POLYDIPSIA: 0
OCCASIONAL FEELINGS OF UNSTEADINESS: 0
PALPITATIONS: 0
DYSURIA: 0
BACK PAIN: 0
SEIZURES: 0

## 2024-12-02 ASSESSMENT — PATIENT HEALTH QUESTIONNAIRE - PHQ9
SUM OF ALL RESPONSES TO PHQ9 QUESTIONS 1 AND 2: 0
2. FEELING DOWN, DEPRESSED OR HOPELESS: NOT AT ALL
1. LITTLE INTEREST OR PLEASURE IN DOING THINGS: NOT AT ALL

## 2024-12-02 ASSESSMENT — COLUMBIA-SUICIDE SEVERITY RATING SCALE - C-SSRS
2. HAVE YOU ACTUALLY HAD ANY THOUGHTS OF KILLING YOURSELF?: NO
1. IN THE PAST MONTH, HAVE YOU WISHED YOU WERE DEAD OR WISHED YOU COULD GO TO SLEEP AND NOT WAKE UP?: NO
6. HAVE YOU EVER DONE ANYTHING, STARTED TO DO ANYTHING, OR PREPARED TO DO ANYTHING TO END YOUR LIFE?: NO

## 2024-12-02 ASSESSMENT — PAIN SCALES - GENERAL: PAINLEVEL_OUTOF10: 3

## 2024-12-02 NOTE — PROGRESS NOTES
"Subjective   Patient ID: Minoo Lopez is a 58 y.o. female who presents for Annual Exam (Pulled something in back, and she wants to know if it is ok to take Ibuprofen ).    HPI     Takes Xanax for traveling         Had cholecystectomy on 11/01/22 by Dr Marquez. Had diarrhea for first few days which has resolved now.         She was diagnosed with A fib when she was admitted for acute cholecystitis. Currently on sotalol and eliquis         Had Colonoscopy in 2020, by Dr Siddiqui, will get records. - advised repeat in 10 years.    Low back pain since last few days, stated better today  Took Tylenol once, which helped  Denied any tingling or numbness in her feet    Review of Systems   Constitutional:  Negative for chills, fatigue and unexpected weight change.   HENT:  Negative for postnasal drip, sinus pressure and trouble swallowing.    Respiratory:  Negative for cough, shortness of breath and wheezing.    Cardiovascular:  Negative for chest pain, palpitations and leg swelling.   Gastrointestinal:  Negative for abdominal pain, blood in stool, nausea and vomiting.   Endocrine: Negative for polydipsia, polyphagia and polyuria.   Genitourinary:  Negative for dysuria and frequency.   Musculoskeletal:  Negative for back pain and myalgias.   Skin:  Negative for rash.   Neurological:  Negative for tremors, seizures and numbness.   Psychiatric/Behavioral:  Negative for behavioral problems.        Objective   /80 (BP Location: Left arm, Patient Position: Sitting, BP Cuff Size: Large adult)   Pulse 63   Temp 36.1 °C (96.9 °F) (Temporal)   Ht 1.6 m (5' 3\")   Wt 99.3 kg (219 lb)   SpO2 98%   BMI 38.79 kg/m²     Physical Exam  Constitutional:       General: She is not in acute distress.  HENT:      Head: Normocephalic and atraumatic.      Right Ear: Tympanic membrane normal.      Left Ear: Tympanic membrane normal.   Eyes:      Extraocular Movements: Extraocular movements intact.      Conjunctiva/sclera: " Conjunctivae normal.      Pupils: Pupils are equal, round, and reactive to light.   Neck:      Vascular: No carotid bruit.   Cardiovascular:      Rate and Rhythm: Normal rate and regular rhythm.      Pulses: Normal pulses.      Heart sounds: No murmur heard.  Pulmonary:      Effort: Pulmonary effort is normal.      Breath sounds: Normal breath sounds. No wheezing or rales.   Abdominal:      General: Bowel sounds are normal.      Palpations: Abdomen is soft. There is no mass.      Tenderness: There is no abdominal tenderness. There is no guarding.   Musculoskeletal:         General: Normal range of motion.      Cervical back: Normal range of motion and neck supple.      Right lower leg: No edema.      Left lower leg: No edema.   Lymphadenopathy:      Cervical: No cervical adenopathy.   Skin:     General: Skin is warm and dry.      Findings: No lesion.   Neurological:      General: No focal deficit present.      Mental Status: She is alert and oriented to person, place, and time.      Sensory: No sensory deficit.      Gait: Gait normal.   Psychiatric:         Mood and Affect: Mood normal.         Assessment/Plan        Minoo was seen today for annual exam.  Diagnoses and all orders for this visit:  Annual physical exam (Primary)  -     CBC and Auto Differential; Future  -     Hepatic function panel; Future  Anxiety  -     ALPRAZolam (Xanax) 0.25 mg tablet; Take 1 tablet (0.25 mg) by mouth 2 times a day as needed for anxiety for up to 7 days.  Paroxysmal atrial fibrillation (Multi)  Low back pain, unspecified back pain laterality, unspecified chronicity, unspecified whether sciatica present  -     cyclobenzaprine (Flexeril) 5 mg tablet; Take 1 tablet (5 mg) by mouth as needed at bedtime for muscle spasms.  Hyperglycemia  -     Hemoglobin A1C; Future     Advised to avoid NSAID's , take tylenol as needed for pain  Recommended physical therapy if no improvement pain  Give us a call we will give referral    Lab Results  "  Component Value Date    CHOL 203 (H) 11/11/2021    CHOL 165 10/24/2020    CHOL 173 10/05/2019     Lab Results   Component Value Date    HDL 56 11/11/2021    HDL 47 (L) 10/24/2020    HDL 50 (L) 10/05/2019     Lab Results   Component Value Date    LDLCALC 128 11/11/2021    LDLCALC 94 10/24/2020    LDLCALC 100 10/05/2019     Lab Results   Component Value Date    TRIG 94 11/11/2021    TRIG 120 10/24/2020    TRIG 114 10/05/2019     No components found for: \"CHOLHDL\"      Current Outpatient Medications   Medication Instructions    ALPRAZolam (XANAX) 0.25 mg, oral, 2 times daily PRN    apixaban (Eliquis) 5 mg tablet TAKE 1 TABLET BY MOUTH TWICE A DAY FOR 90 DAYS    ascorbic acid (VITAMIN C ORAL) 1 tablet, Daily    b complex 0.4 mg tablet as directed Orally    cholecalciferol (VITAMIN D-3) 2,000 Units, Daily    coenzyme Q-10 (Co Q-10) 100 mg capsule as directed Orally    cyclobenzaprine (FLEXERIL) 5 mg, oral, Nightly PRN    Mirena 21 mcg/24 hours (8 yrs) 52 mg IUD 1 each, Once    multivit-min/iron/folic acid/K (ADULTS MULTIVITAMIN ORAL) 1 tablet, Daily    sotalol (BETAPACE) 80 mg, oral, 2 times daily    zinc gluconate 100 mg tablet 1 tablet, Daily        "

## 2025-03-04 ENCOUNTER — OFFICE VISIT (OUTPATIENT)
Dept: CARDIOLOGY | Facility: CLINIC | Age: 60
End: 2025-03-04
Payer: COMMERCIAL

## 2025-03-04 VITALS
BODY MASS INDEX: 39.68 KG/M2 | WEIGHT: 224 LBS | SYSTOLIC BLOOD PRESSURE: 132 MMHG | HEART RATE: 80 BPM | DIASTOLIC BLOOD PRESSURE: 78 MMHG

## 2025-03-04 DIAGNOSIS — I48.0 PAF (PAROXYSMAL ATRIAL FIBRILLATION) (MULTI): Primary | ICD-10-CM

## 2025-03-04 PROCEDURE — G2211 COMPLEX E/M VISIT ADD ON: HCPCS | Performed by: INTERNAL MEDICINE

## 2025-03-04 PROCEDURE — 93005 ELECTROCARDIOGRAM TRACING: CPT | Performed by: INTERNAL MEDICINE

## 2025-03-04 PROCEDURE — 1036F TOBACCO NON-USER: CPT | Performed by: INTERNAL MEDICINE

## 2025-03-04 PROCEDURE — 99213 OFFICE O/P EST LOW 20 MIN: CPT | Mod: 25 | Performed by: INTERNAL MEDICINE

## 2025-03-04 PROCEDURE — 93010 ELECTROCARDIOGRAM REPORT: CPT | Performed by: INTERNAL MEDICINE

## 2025-03-04 PROCEDURE — 99213 OFFICE O/P EST LOW 20 MIN: CPT | Performed by: INTERNAL MEDICINE

## 2025-03-04 ASSESSMENT — PAIN SCALES - GENERAL: PAINLEVEL_OUTOF10: 0-NO PAIN

## 2025-03-04 ASSESSMENT — PATIENT HEALTH QUESTIONNAIRE - PHQ9
1. LITTLE INTEREST OR PLEASURE IN DOING THINGS: NOT AT ALL
2. FEELING DOWN, DEPRESSED OR HOPELESS: NOT AT ALL
SUM OF ALL RESPONSES TO PHQ9 QUESTIONS 1 AND 2: 0

## 2025-03-04 ASSESSMENT — ENCOUNTER SYMPTOMS
DEPRESSION: 0
LOSS OF SENSATION IN FEET: 0
OCCASIONAL FEELINGS OF UNSTEADINESS: 0

## 2025-03-04 NOTE — ASSESSMENT & PLAN NOTE
History as above.  Continue current regimen.  The patient remains reluctant to pursue catheter-based therapy given her severe anxiety.

## 2025-03-04 NOTE — PROGRESS NOTES
Chief Complaint   Patient presents with    Follow-up            The patient is a 59-year-old female with a history of paroxysmal atrial fibrillation in the setting of obesity.  She is maintained on sotalol and apixaban.  She is here for her 6-month follow-up.  She has been reluctant to pursue catheter-based therapy in the past and prefers pharmacological therapy.  The patient often goes into atrial fibrillation prior to visiting physicians office as she has today.  She notes over the past 6 months she has had about 5 episodes of atrial fibrillation usually in the setting of anxiety that lasts about 12 hours at the maximum.         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    Irregular bleeding 08/31/2023    PMB (postmenopausal bleeding) 08/31/2023    Labile blood pressure 08/31/2023    Left thyroid nodule 08/31/2023    Low back pain 09/13/2019    Nontoxic multinodular goiter 08/31/2023    Morbid (severe) obesity due to excess calories (Multi) 08/31/2023    Obesity (BMI 30-39.9) 08/31/2023    Paroxysmal atrial fibrillation (Multi) 08/31/2023    Perimenopause 08/31/2023    Reflux esophagitis 09/13/2019    Sinus node dysfunction (Multi) 08/31/2023    Vitamin D deficiency 08/31/2023    Screening mammogram for breast cancer 02/29/2024    Surveillance of intrauterine contraceptive device 02/29/2024    Body mass index 40.0-44.9, adult (Multi) 02/29/2024    Chronic anticoagulation 02/29/2024     Resolved Ambulatory Problems     Diagnosis Date Noted    Rapid atrial fibrillation (Multi) 08/31/2023    Tachycardia-bradycardia  "(Multi) 08/31/2023     Past Medical History:   Diagnosis Date    A-fib (Multi)         Review of Systems   All other systems reviewed and are negative.       Objective     There were no vitals filed for this visit.     Vitals and nursing note reviewed.   Constitutional:       Appearance: Healthy appearance. Obese.   HENT:    Mouth/Throat:      Pharynx: Oropharynx is clear.   Pulmonary:      Effort: Pulmonary effort is normal.      Breath sounds: Normal breath sounds.   Cardiovascular:      PMI at left midclavicular line. Normal rate. Regular rhythm. Normal S1. Normal S2.       Murmurs: There is a grade 1/6 holosystolic murmur.      No gallop.  No click. No rub.   Pulses:     Intact distal pulses.   Edema:     Peripheral edema absent.   Abdominal:      General: Bowel sounds are normal.   Musculoskeletal:      Cervical back: Normal range of motion. Skin:     General: Skin is warm and dry.   Neurological:      General: No focal deficit present.      Mental Status: Alert and oriented to person, place and time.            Lab Review:   No visits with results within 2 Month(s) from this visit.   Latest known visit with results is:   [facility] Encounter on 07/14/2023   Component Date Value    CONVERTED FINAL DIAGNOSIS 07/14/2023                      Value:  ENDOMETRIUM, CURETTINGS:       PROLIFERATIVE-TYPE ENDOMETRIUM, SEE COMMENT.    Comment: There is no atypical endometrial hyperplasia or malignancy.  Margaux Voss M.D.  07/17/2023 16:35  [facility] - 07/17/2023      CONVERTED CLINICAL DIAGN* 07/14/2023 Post menopausal bleeding     CONVERTED GROSS DESCRIPT* 07/14/2023                      Value:Received in formalin labeled with the patient's name and hospital number  and, \"[facility]\" is a 0.8 x 0.6 x 0.1 cm aggregate of gray-tan soft tissue  fragments and possible mucus. The specimen is entirely submitted in one  cassette.      CONVERTED PRE-OPERATIVE * 07/14/2023 D&C, hysteroscopy     CONVERTED MICROSCOPIC DE* 07/14/2023 Slides " examined; description omitted.        ECG:  Atrial fibrillation with rate controlled ventricular response of 80 bpm QRS duration 70 ms low voltage QTc 410 ms    Assessment/plan:  History as above.  Continue current regimen.  The patient remains reluctant to pursue catheter-based therapy given her severe anxiety.    Problem List Items Addressed This Visit    None  Visit Diagnoses       PAF (paroxysmal atrial fibrillation) (Multi)    -  Primary    Relevant Orders    ECG 12 lead (Clinic Performed)

## 2025-03-10 ASSESSMENT — ENCOUNTER SYMPTOMS
JOINT SWELLING: 0
FATIGUE: 0
WEAKNESS: 0
HEADACHES: 0
SHORTNESS OF BREATH: 0
DIZZINESS: 0
DYSURIA: 0
UNEXPECTED WEIGHT CHANGE: 0
ACTIVITY CHANGE: 0
CHEST TIGHTNESS: 0
COLOR CHANGE: 0
ADENOPATHY: 0
ABDOMINAL PAIN: 0
DIFFICULTY URINATING: 0
ABDOMINAL DISTENTION: 0

## 2025-03-10 NOTE — PROGRESS NOTES
"Annual-menopause  Subjective   Minoo Lopez is a 59 y.o. female , last seen 2/28/2024 ,who is here for a routine exam/IUD surveillance.   Complaints: Hx of H/S  placement of Mirena IUD 07/2023; path showing proliferative endometrium at age 57, as well as endocervical polyp; due to atrial fibrillation, patient is on chronic anticoagulation.  Denies vag bleed or discharge since last exam; denies any current pelvic  pain, pressure, or persistent bloating.  Last pap 02/2022 neg/HPV neg  Last mamm 4/10/2024 negative; TC risk 17%; 2023 completed breast MRI due to TC risk estimate 23%  Last colonoscopy 01/2020 negative; repeat 10 years per Dr. Siddiqui  Last pelvic ultrasound 06/2024: IUD in position/endometrium 5 mm   Review of Systems   Constitutional:  Negative for activity change, fatigue and unexpected weight change.   Respiratory:  Negative for chest tightness and shortness of breath.    Cardiovascular:  Negative for chest pain and leg swelling.   Gastrointestinal:  Negative for abdominal distention and abdominal pain.   Genitourinary:  Negative for difficulty urinating, dysuria, genital sores, pelvic pain, vaginal bleeding, vaginal discharge and vaginal pain.   Musculoskeletal:  Negative for gait problem and joint swelling.   Skin:  Negative for color change and rash.   Neurological:  Negative for dizziness, weakness and headaches.   Hematological:  Negative for adenopathy.   Objective Visit Vitals  /88   Ht 1.6 m (5' 3\")   Wt 103 kg (228 lb)   BMI 40.39 kg/m²   OB Status IUD   Smoking Status Never   BSA 2.14 m²       General:   Alert and oriented, in no acute distress   Neck: Supple. No visible thyromegaly.    Breast/Axilla: Normal to palpation bilaterally without masses, skin changes, or nipple discharge.    Abdomen: Soft, non-tender, without masses or organomegaly   Vulva: Normal architecture without erythema, masses, or lesions.    Vagina: Vault is long and narrow ;normal mucosa without lesions, " masses.  No abnormal vaginal discharge.    Cervix: High in vault ;normal without masses, lesions, or signs of cervicitis.  IUD strings palpable/no device is exposed   Uterus: Grossly normal mobile, non-enlarged uterus; exam limited by BMI   Adnexa: No palpable  masses or tenderness; exam limited by BMI   Pelvic Floor No POP noted. No high tone pelvic floor    Psych  Rectal Normal affect. Normal mood.   Deferred   Assessment/Plan   Encounter Diagnoses   Name Primary?    Well female exam with routine gynecological exam; no suspicious findings on current breast or pelvic exam Yes    Screening mammogram for breast cancer; order placed     Irregular bleeding; completed full hysteroscopic evaluation tissue sampling was benign per path; Mirena IUD placed and all bleeding has resolved since then.  Risk for endometrial bleeding include high BMI and chronic anticoagulation.       Perimenopause; irregular bleeding controlled as above     Surveillance of intrauterine contraceptive device; proper position on exam     Chronic anticoagulation; required due to atrial fibrillation; persistent risk for postmenopausal bleeding     Body mass index 40.0-44.9, adult (Multi); high adiposity confers higher endogenous estrogen levels; without progesterone treatment of her IUD patient would be at elevated risk for endometrial hyperplasia/carcinoma.     Screen for colon cancer; screening up-to-date and negative.     Sabrina Campos MD

## 2025-03-13 ENCOUNTER — OFFICE VISIT (OUTPATIENT)
Dept: OBSTETRICS AND GYNECOLOGY | Facility: CLINIC | Age: 60
End: 2025-03-13
Payer: COMMERCIAL

## 2025-03-13 VITALS
DIASTOLIC BLOOD PRESSURE: 88 MMHG | HEIGHT: 63 IN | SYSTOLIC BLOOD PRESSURE: 146 MMHG | BODY MASS INDEX: 40.4 KG/M2 | WEIGHT: 228 LBS

## 2025-03-13 DIAGNOSIS — Z01.419 WELL FEMALE EXAM WITH ROUTINE GYNECOLOGICAL EXAM: Primary | ICD-10-CM

## 2025-03-13 DIAGNOSIS — N92.6 IRREGULAR BLEEDING: ICD-10-CM

## 2025-03-13 DIAGNOSIS — Z12.11 SCREEN FOR COLON CANCER: ICD-10-CM

## 2025-03-13 DIAGNOSIS — Z79.01 CHRONIC ANTICOAGULATION: ICD-10-CM

## 2025-03-13 DIAGNOSIS — Z30.431 SURVEILLANCE OF INTRAUTERINE CONTRACEPTIVE DEVICE: ICD-10-CM

## 2025-03-13 DIAGNOSIS — N95.1 PERIMENOPAUSE: ICD-10-CM

## 2025-03-13 DIAGNOSIS — Z12.31 SCREENING MAMMOGRAM FOR BREAST CANCER: ICD-10-CM

## 2025-03-13 PROCEDURE — 99396 PREV VISIT EST AGE 40-64: CPT | Performed by: OBSTETRICS & GYNECOLOGY

## 2025-03-13 PROCEDURE — 3008F BODY MASS INDEX DOCD: CPT | Performed by: OBSTETRICS & GYNECOLOGY

## 2025-03-13 PROCEDURE — 1036F TOBACCO NON-USER: CPT | Performed by: OBSTETRICS & GYNECOLOGY

## 2025-03-13 ASSESSMENT — ENCOUNTER SYMPTOMS
OCCASIONAL FEELINGS OF UNSTEADINESS: 0
LOSS OF SENSATION IN FEET: 0
DEPRESSION: 0

## 2025-03-13 ASSESSMENT — PATIENT HEALTH QUESTIONNAIRE - PHQ9
1. LITTLE INTEREST OR PLEASURE IN DOING THINGS: NOT AT ALL
2. FEELING DOWN, DEPRESSED OR HOPELESS: NOT AT ALL
SUM OF ALL RESPONSES TO PHQ9 QUESTIONS 1 & 2: 0

## 2025-03-13 ASSESSMENT — PAIN SCALES - GENERAL: PAINLEVEL_OUTOF10: 0-NO PAIN

## 2025-03-13 ASSESSMENT — LIFESTYLE VARIABLES
AUDIT-C TOTAL SCORE: 2
HOW OFTEN DO YOU HAVE SIX OR MORE DRINKS ON ONE OCCASION: NEVER
HOW MANY STANDARD DRINKS CONTAINING ALCOHOL DO YOU HAVE ON A TYPICAL DAY: 1 OR 2
HOW OFTEN DO YOU HAVE A DRINK CONTAINING ALCOHOL: 2-4 TIMES A MONTH
SKIP TO QUESTIONS 9-10: 1

## 2025-04-12 ENCOUNTER — HOSPITAL ENCOUNTER (OUTPATIENT)
Dept: RADIOLOGY | Facility: CLINIC | Age: 60
Discharge: HOME | End: 2025-04-12
Payer: COMMERCIAL

## 2025-04-12 DIAGNOSIS — Z12.31 SCREENING MAMMOGRAM FOR BREAST CANCER: ICD-10-CM

## 2025-04-12 PROCEDURE — 77067 SCR MAMMO BI INCL CAD: CPT

## 2025-04-12 PROCEDURE — 77067 SCR MAMMO BI INCL CAD: CPT | Performed by: STUDENT IN AN ORGANIZED HEALTH CARE EDUCATION/TRAINING PROGRAM

## 2025-04-12 PROCEDURE — 77063 BREAST TOMOSYNTHESIS BI: CPT | Performed by: STUDENT IN AN ORGANIZED HEALTH CARE EDUCATION/TRAINING PROGRAM
